# Patient Record
(demographics unavailable — no encounter records)

---

## 2025-02-04 NOTE — PHYSICAL EXAM
[Well Developed] : well developed [NAD] : in no acute distress [Adipose Appearing] : adipose appearing [PERRL] : pupils were equal, round, reactive to light  [Moist & Pink Mucous Membranes] : moist and pink mucous membranes [CTAB] : lungs clear to auscultation bilaterally [Regular Rate and Rhythm] : regular rate and rhythm [Normal S1, S2] : normal S1 and S2 [Soft] : soft  [Obese] : obese [Normal Bowel Sounds] : normal bowel sounds [No HSM] : no hepatosplenomegaly appreciated [Normal Tone] : normal tone [Well-Perfused] : well-perfused [Interactive] : interactive [icteric] : anicteric [Respiratory Distress] : no respiratory distress  [Distended] : non distended [Tender] : non tender [Edema] : no edema [Cyanosis] : no cyanosis [Rash] : no rash [Jaundice] : no jaundice

## 2025-02-04 NOTE — HISTORY OF PRESENT ILLNESS
[de-identified] : referred by  RIRI SANFORD  STELLA WATKINS , is  a 11 year old female here for initial consultation for  ? celiac disease FH celiac disease (mom). She was screened for celiac disease for routine screening given that her mother has celiac disease TTG IgA is normal.  Endomysial IgA normal.  Deamidated gliadin peptide IgA noted to be mildly elevated at 15.5.  Less than 15 is normal.  Deamidated gliadin peptide IgG is normal.  CBC, CMP were normal ferritin 50.  Vitamin D19.8  no GI complains did have itchiness When eating regular bread for several days.  She switched to gluten-free bread and then Back to regular bread.  The itchiness resolved while on gluten-free bread but continued when switching to gluten containing bread  Stool described as soft and daily with no blood or mucus noted.  Is followed by Dr. Hernandez for precocious puberty.   Denies abdominal pain, nausea, vomiting, constipation, diarrhea, easy bleeding or bruising, jaundice, hematochezia, melena, recurrent fevers or infection, mouth sores, joint swelling, vision changes, unintentional weight loss.   Denies choking, dysphagia, cyanosis with feeds.

## 2025-02-04 NOTE — REASON FOR VISIT
[Consultation] : a consultation visit [Mother] : mother [FreeTextEntry2] : Celiac disease evaluation

## 2025-02-04 NOTE — CONSULT LETTER
[Dear  ___] : Dear  [unfilled], [Consult Letter:] : I had the pleasure of evaluating your patient, [unfilled]. [Please see my note below.] : Please see my note below. [Consult Closing:] : Thank you very much for allowing me to participate in the care of this patient.  If you have any questions, please do not hesitate to contact me. [Sincerely,] : Sincerely, [FreeTextEntry3] : Edel Pitts MD [facility] physician partners Pediatric gastroenterologist , [facility] [phone] [email or web address]

## 2025-02-04 NOTE — ASSESSMENT
[Educated Patient & Family about Diagnosis] : educated the patient and family about the diagnosis [FreeTextEntry1] : STELLA  is a 11 year old Female with precocious puberty and family history of celiac disease here for consultation for positive celiac auto antibodies , DGP IgA 15.5 but TTG IgA and endomysial antibodies normal.. She has no GI symptoms Explained to the family that TTG IgA is normal therefore no need for screening at this point.  Follow-up with GI as needed.  Explained to mom that she will need to be screened in the 3 to 5 years for celiac disease         Recommendations Labs:  Celiac Genetics,  [] EGD     Follow up []